# Patient Record
Sex: MALE | Race: WHITE
[De-identification: names, ages, dates, MRNs, and addresses within clinical notes are randomized per-mention and may not be internally consistent; named-entity substitution may affect disease eponyms.]

---

## 2018-07-13 ENCOUNTER — HOSPITAL ENCOUNTER (EMERGENCY)
Dept: HOSPITAL 56 - MW.ED | Age: 23
Discharge: HOME | End: 2018-07-13
Payer: COMMERCIAL
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
## 2018-07-13 DIAGNOSIS — R51: ICD-10-CM

## 2018-07-13 DIAGNOSIS — G40.909: ICD-10-CM

## 2018-07-13 DIAGNOSIS — S20.211A: Primary | ICD-10-CM

## 2018-07-13 PROCEDURE — 99284 EMERGENCY DEPT VISIT MOD MDM: CPT

## 2018-07-13 PROCEDURE — 71046 X-RAY EXAM CHEST 2 VIEWS: CPT
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
